# Patient Record
(demographics unavailable — no encounter records)

---

## 2024-10-21 NOTE — HISTORY OF PRESENT ILLNESS
[FreeTextEntry1] :  62 year old female, seen for hearing aid check.  Pt is aided with binaural Widex Un110 Fusion hearing aids, OOW 12/17/22.     [FreeTextEntry8] : Patient reports hearing aids still working but notes that right may be weaker than left.   Patient scheduled for AEA prior to HAC, however, AEA could not be performed due to insurance issues.

## 2024-10-21 NOTE — ASSESSMENT
[FreeTextEntry1] : Thoroughly cleaned and checked hearing aids. Ran hearing aids through [employer]. No excessive moisture removed from devices. Listening check revealed hearing aids to be in good working condition and amplification to be similar.   Recommended obtaining hearing test and pursuing new hearing aids at vendor that is in network with her insurance. Provided list of insurance approved vendors. Patient happy with today's services.   REC: Updated audio and hearing aid evaluation at insurance approved vendor.